# Patient Record
Sex: MALE | Race: WHITE
[De-identification: names, ages, dates, MRNs, and addresses within clinical notes are randomized per-mention and may not be internally consistent; named-entity substitution may affect disease eponyms.]

---

## 2019-11-13 ENCOUNTER — HOSPITAL ENCOUNTER (EMERGENCY)
Dept: HOSPITAL 75 - ER FS | Age: 59
Discharge: HOME | End: 2019-11-13
Payer: COMMERCIAL

## 2019-11-13 VITALS — WEIGHT: 156.53 LBS | HEIGHT: 66.97 IN | BODY MASS INDEX: 24.57 KG/M2

## 2019-11-13 VITALS — DIASTOLIC BLOOD PRESSURE: 79 MMHG | SYSTOLIC BLOOD PRESSURE: 129 MMHG

## 2019-11-13 DIAGNOSIS — T40.2X5A: ICD-10-CM

## 2019-11-13 DIAGNOSIS — R11.0: Primary | ICD-10-CM

## 2019-11-13 DIAGNOSIS — F15.10: ICD-10-CM

## 2019-11-13 DIAGNOSIS — T39.1X5A: ICD-10-CM

## 2019-11-13 LAB
ALBUMIN SERPL-MCNC: 4.1 GM/DL (ref 3.2–4.5)
ALP SERPL-CCNC: 99 U/L (ref 40–136)
ALT SERPL-CCNC: 14 U/L (ref 0–55)
APAP SERPL-MCNC: < 10 UG/ML (ref 10–30)
APTT PPP: YELLOW S
BACTERIA #/AREA URNS HPF: NEGATIVE /HPF
BARBITURATES UR QL: NEGATIVE
BASOPHILS # BLD AUTO: 0.1 10^3/UL (ref 0–0.1)
BASOPHILS NFR BLD AUTO: 1 % (ref 0–10)
BENZODIAZ UR QL SCN: NEGATIVE
BILIRUB SERPL-MCNC: 0.5 MG/DL (ref 0.1–1)
BILIRUB UR QL STRIP: NEGATIVE
BUN/CREAT SERPL: 11
CALCIUM SERPL-MCNC: 9.2 MG/DL (ref 8.5–10.1)
CHLORIDE SERPL-SCNC: 101 MMOL/L (ref 98–107)
CO2 SERPL-SCNC: 26 MMOL/L (ref 21–32)
COCAINE UR QL: NEGATIVE
CREAT SERPL-MCNC: 1.05 MG/DL (ref 0.6–1.3)
EOSINOPHIL # BLD AUTO: 0.3 10^3/UL (ref 0–0.3)
EOSINOPHIL NFR BLD AUTO: 4 % (ref 0–10)
ERYTHROCYTE [DISTWIDTH] IN BLOOD BY AUTOMATED COUNT: 13.1 % (ref 10–14.5)
FIBRINOGEN PPP-MCNC: CLEAR MG/DL
GFR SERPLBLD BASED ON 1.73 SQ M-ARVRAT: > 60 ML/MIN
GLUCOSE SERPL-MCNC: 108 MG/DL (ref 70–105)
GLUCOSE UR STRIP-MCNC: NEGATIVE MG/DL
HCT VFR BLD CALC: 43 % (ref 40–54)
HGB BLD-MCNC: 13.7 G/DL (ref 13.3–17.7)
KETONES UR QL STRIP: NEGATIVE
LEUKOCYTE ESTERASE UR QL STRIP: NEGATIVE
LYMPHOCYTES # BLD AUTO: 1.6 X 10^3 (ref 1–4)
LYMPHOCYTES NFR BLD AUTO: 20 % (ref 12–44)
MANUAL DIFFERENTIAL PERFORMED BLD QL: NO
MCH RBC QN AUTO: 29 PG (ref 25–34)
MCHC RBC AUTO-ENTMCNC: 32 G/DL (ref 32–36)
MCV RBC AUTO: 89 FL (ref 80–99)
METHADONE UR QL SCN: NEGATIVE
METHAMPHETAMINE SCREEN URINE S: NEGATIVE
MONOCYTES # BLD AUTO: 0.5 X 10^3 (ref 0–1)
MONOCYTES NFR BLD AUTO: 6 % (ref 0–12)
NEUTROPHILS # BLD AUTO: 5.5 X 10^3 (ref 1.8–7.8)
NEUTROPHILS NFR BLD AUTO: 69 % (ref 42–75)
NITRITE UR QL STRIP: NEGATIVE
OPIATES UR QL SCN: NEGATIVE
OTHER ELEMENTS URNS MICRO: (no result) /HPF
OXYCODONE UR QL: NEGATIVE
PH UR STRIP: 6.5 [PH] (ref 5–9)
PLATELET # BLD: 304 10^3/UL (ref 130–400)
PMV BLD AUTO: 8.4 FL (ref 7.4–10.4)
POTASSIUM SERPL-SCNC: 4.2 MMOL/L (ref 3.6–5)
PROPOXYPH UR QL: NEGATIVE
PROT SERPL-MCNC: 7 GM/DL (ref 6.4–8.2)
PROT UR QL STRIP: NEGATIVE
RBC #/AREA URNS HPF: (no result) /HPF
SALICYLATES SERPL-MCNC: < 0.3 MG/DL (ref 5–20)
SODIUM SERPL-SCNC: 137 MMOL/L (ref 135–145)
SP GR UR STRIP: 1.02 (ref 1.02–1.02)
SQUAMOUS #/AREA URNS HPF: (no result) /HPF
TRICYCLICS UR QL SCN: NEGATIVE
WBC # BLD AUTO: 8 10^3/UL (ref 4.3–11)
WBC #/AREA URNS HPF: (no result) /HPF

## 2019-11-13 PROCEDURE — 85025 COMPLETE CBC W/AUTO DIFF WBC: CPT

## 2019-11-13 PROCEDURE — 80306 DRUG TEST PRSMV INSTRMNT: CPT

## 2019-11-13 PROCEDURE — 84484 ASSAY OF TROPONIN QUANT: CPT

## 2019-11-13 PROCEDURE — 71045 X-RAY EXAM CHEST 1 VIEW: CPT

## 2019-11-13 PROCEDURE — 70450 CT HEAD/BRAIN W/O DYE: CPT

## 2019-11-13 PROCEDURE — 80320 DRUG SCREEN QUANTALCOHOLS: CPT

## 2019-11-13 PROCEDURE — 36415 COLL VENOUS BLD VENIPUNCTURE: CPT

## 2019-11-13 PROCEDURE — 81000 URINALYSIS NONAUTO W/SCOPE: CPT

## 2019-11-13 PROCEDURE — 83690 ASSAY OF LIPASE: CPT

## 2019-11-13 PROCEDURE — 80053 COMPREHEN METABOLIC PANEL: CPT

## 2019-11-13 PROCEDURE — 93005 ELECTROCARDIOGRAM TRACING: CPT

## 2019-11-13 PROCEDURE — 80329 ANALGESICS NON-OPIOID 1 OR 2: CPT

## 2019-11-13 NOTE — ED CARDIAC GENERAL
History of Present Illness


General


Stated Complaint:  NAUSEA





History of Present Illness


Date Seen by Provider:  Nov 13, 2019


Time Seen by Provider:  08:50


Initial Comments


The patient is a 59-year-old male with a history of chronic right shoulder pain 

for which he takes periodic Norco. Patient presents via EMS with a chief 

complaint of nausea with onset last evening. Patient states he was in his normal

state of health and had dinner without any problems and then took a Morrison 10/325

for his chronic shoulder pain. After that he noted that he was nauseated. He did

not frankly vomit. He had no other symptoms at that time. He then went to bed. 

When he woke up he was still nauseated so he decided to call EMS. He was 

administered Zofran sublingually in route per EMS and his nausea resolved. 

Currently the patient states that he has no symptoms at all aside from a 

slightly unsettled feeling in his stomach. He denies any fevers, nausea or 

vomiting, headache, focal weakness, numbness, tingling, neck stiffness, vision 

changes, shortness of breath or chest pain, abdominal pain of any kind, flank 

pain, back pain, dysuria or hematuria, changes in bowel habits. Patient appears 

in no distress and vital signs are appropriate upon initial evaluation in the 

emergency department.





The patient is noted to nod off at times during conversation and seems a little 

sleepy. Pupils are rather pinpoint. Patient does deny taking any Norco aside 

from last evening. EMS state that they think he may have taken more Norco than 

he is admitting to.





Allergies and Home Medications


Allergies


Coded Allergies:  


     No Known Drug Allergies (Unverified , 11/13/19)





Patient Home Medication List


Home Medication List Reviewed:  Yes





Review of Systems


Review of Systems


Constitutional:  see HPI





All Other Systems Reviewed


Negative Unless Noted:  Yes (Negative excepted noted.)





Past Medical-Social-Family Hx


Past Med/Social Hx:  Reviewed Nursing Past Med/Soc Hx


Patient Social History


Recent Foreign Travel:  No





Family Medical History


Reviewed Nursing Family Hx





Physical Exam


Vital Signs





Vital Signs - First Documented








 11/13/19





 08:51


 


Temp 36.4


 


Pulse 74


 


Resp 18


 


B/P (MAP) 123/91 (102)


 


Pulse Ox 99


 


O2 Delivery Room Air





Capillary Refill :


Height, Weight, BMI


Height: '"


Weight: lbs. oz. kg;  BMI


Method:


General Appearance:  No Apparent Distress


Other comments


This is an older  male appearing nontoxic and in no acute distress. He 

seems a little sleepy but answers questions completely appropriately. Head is 

normocephalic and atraumatic. Neck is supple and nontender. Oropharynx is moist.

Lungs clear to auscultation in all stations. There is a normal S1 and S2 without

rubs or gallops and capillary refill is appropriate, less than 2 seconds 

globally. Abdomen is soft, nontender and nondistended. Skin is warm and dry 

without cyanosis, clubbing or edema. Psychiatrically, the patient demonstrates 

appropriate mood and affect and is alert. Neurologically, cranial nerves II 

through XII are intact and there are no lateralizing deficits noted. Speech is 

normal. Language is normal. Coordination is normal. There is no dysmetria finger

to nose or heel-to-shin bilaterally. Strength is 5 out of 5 in all joints of 

bilateral upper and lower extremities. Sensation is intact to light touch in 

bilateral upper and lower extremities. Patient ambulatory with a narrow, steady 

gait in the emergency department is is alert and oriented 4, though a little 

sleepy as noted. Examination of the left upper extremity is remarkable for mild 

tenderness without erythema, warmth or swelling to the left shoulder. No 

limitation in ranging of the left shoulder and no pain with ranging of any other

joint of the left upper extremity which is neurovascularly intact distally.





Progress/Results/Core Measures


Results/Orders


Lab Results





Laboratory Tests








Test


 11/13/19


09:00 11/13/19


11:25 Range/Units


 


 


White Blood Count


 8.0 


 


 4.3-11.0


10^3/uL


 


Red Blood Count


 4.78 


 


 4.35-5.85


10^6/uL


 


Hemoglobin 13.7   13.3-17.7  G/DL


 


Hematocrit 43   40-54  %


 


Mean Corpuscular Volume 89   80-99  FL


 


Mean Corpuscular Hemoglobin 29   25-34  PG


 


Mean Corpuscular Hemoglobin


Concent 32 


 


 32-36  G/DL





 


Red Cell Distribution Width 13.1   10.0-14.5  %


 


Platelet Count


 304 


 


 130-400


10^3/uL


 


Mean Platelet Volume 8.4   7.4-10.4  FL


 


Neutrophils (%) (Auto) 69   42-75  %


 


Lymphocytes (%) (Auto) 20   12-44  %


 


Monocytes (%) (Auto) 6   0-12  %


 


Eosinophils (%) (Auto) 4   0-10  %


 


Basophils (%) (Auto) 1   0-10  %


 


Neutrophils # (Auto) 5.5   1.8-7.8  X 10^3


 


Lymphocytes # (Auto) 1.6   1.0-4.0  X 10^3


 


Monocytes # (Auto) 0.5   0.0-1.0  X 10^3


 


Eosinophils # (Auto)


 0.3 


 


 0.0-0.3


10^3/uL


 


Basophils # (Auto)


 0.1 


 


 0.0-0.1


10^3/uL


 


Sodium Level 137   135-145  MMOL/L


 


Potassium Level 4.2   3.6-5.0  MMOL/L


 


Chloride Level 101     MMOL/L


 


Carbon Dioxide Level 26   21-32  MMOL/L


 


Anion Gap 10   5-14  MMOL/L


 


Blood Urea Nitrogen 12   7-18  MG/DL


 


Creatinine


 1.05 


 


 0.60-1.30


MG/DL


 


Estimat Glomerular Filtration


Rate > 60 


 


  





 


BUN/Creatinine Ratio 11    


 


Glucose Level 108 H    MG/DL


 


Calcium Level 9.2   8.5-10.1  MG/DL


 


Corrected Calcium 9.1   8.5-10.1  MG/DL


 


Total Bilirubin 0.5   0.1-1.0  MG/DL


 


Aspartate Amino Transf


(AST/SGOT) 23 


 


 5-34  U/L





 


Alanine Aminotransferase


(ALT/SGPT) 14 


 


 0-55  U/L





 


Alkaline Phosphatase 99     U/L


 


Troponin I < 0.30   <0.30  NG/ML


 


Total Protein 7.0   6.4-8.2  GM/DL


 


Albumin 4.1   3.2-4.5  GM/DL


 


Lipase 18   8-78  U/L


 


Salicylates Level < 0.3 L  5.0-20.0  MG/DL


 


Acetaminophen Level < 10 L  10-30  UG/ML


 


Serum Alcohol < 10   <10  MG/DL


 


Urine Color  YELLOW   


 


Urine Clarity  CLEAR   


 


Urine pH  6.5  5-9  


 


Urine Specific Gravity  1.020  1.016-1.022  


 


Urine Protein  NEGATIVE  NEGATIVE  


 


Urine Glucose (UA)  NEGATIVE  NEGATIVE  


 


Urine Ketones  NEGATIVE  NEGATIVE  


 


Urine Nitrite  NEGATIVE  NEGATIVE  


 


Urine Bilirubin  NEGATIVE  NEGATIVE  


 


Urine Urobilinogen  0.2  < = 1.0  MG/DL


 


Urine Leukocyte Esterase  NEGATIVE  NEGATIVE  


 


Urine RBC (Auto)  NEGATIVE  NEGATIVE  


 


Urine RBC  NONE   /HPF


 


Urine WBC  0-2   /HPF


 


Urine Squamous Epithelial


Cells 


 0-2 


  /HPF





 


Urine Crystals  NONE   /LPF


 


Urine Bacteria  NEGATIVE   /HPF


 


Urine Casts  NONE   /LPF


 


Urine Mucus  MODERATE H  /LPF


 


Urine Other  FEW SPERM H  /HPF


 


Urine Culture Indicated  NO   


 


Urine Opiates Screen  NEGATIVE  NEGATIVE  


 


Urine Oxycodone Screen  NEGATIVE  NEGATIVE  


 


Urine Methadone Screen  NEGATIVE  NEGATIVE  


 


Urine Propoxyphene Screen  NEGATIVE  NEGATIVE  


 


Urine Barbiturates Screen  NEGATIVE  NEGATIVE  


 


Ur Tricyclic Antidepressants


Screen 


 NEGATIVE 


 NEGATIVE  





 


Urine Phencyclidine Screen  NEGATIVE  NEGATIVE  


 


Urine Amphetamines Screen  POSITIVE H NEGATIVE  


 


Urine Methamphetamines Screen  NEGATIVE  NEGATIVE  


 


Urine Benzodiazepines Screen  NEGATIVE  NEGATIVE  


 


Urine Cocaine Screen  NEGATIVE  NEGATIVE  


 


Urine Cannabinoids Screen  NEGATIVE  NEGATIVE  








My Orders





Orders - TEJAL MONTIEL MD


Comprehensive Metabolic Panel (11/13/19 09:00)


Troponin I Fs (11/13/19 09:00)


Ekg Tracing (11/13/19 09:00)


Acetaminophen Tablet/Caplet (Tylenol  T (11/13/19 09:00)


Cbc With Automated Diff (11/13/19 09:08)


Naloxone  Injection (Narcan Injection) (11/13/19 09:15)


Ketorolac Injection (Toradol Injection) (11/13/19 09:15)


Metoclopramide Injection (Reglan Injecti (11/13/19 09:15)


Ed Iv/Invasive Line Start (11/13/19 09:08)


Ns Iv 1000 Ml (Sodium Chloride 0.9%) (11/13/19 09:08)


Lipase (11/13/19 09:21)


Ct Head Wo (11/13/19 09:40)


Acetaminophen (11/13/19 09:40)


Salicylate (11/13/19 09:40)


Alcohol (11/13/19 09:40)


Drug Screen Stat (Urine) (11/13/19 09:40)


Chest 1 View Ap/Pa Only (11/13/19 09:40)


Ua Culture If Indicated (11/13/19 09:40)





Medications Given in ED





Current Medications








 Medications  Dose


 Ordered  Sig/Bree


 Route  Start Time


 Stop Time Status Last Admin


Dose Admin


 


 Acetaminophen  975 mg  ONCE  ONCE


 PO  11/13/19 09:00


 11/13/19 09:03 DC 11/13/19 09:13


975 MG


 


 Ketorolac


 Tromethamine  30 mg  ONCE  ONCE


 IVP  11/13/19 09:15


 11/13/19 09:16 DC 11/13/19 09:20


30 MG


 


 Metoclopramide HCl  10 mg  ONCE  ONCE


 IVP  11/13/19 09:15


 11/13/19 09:16 DC 11/13/19 09:20


10 MG


 


 Naloxone HCl  0.4 mg  ONCE  ONCE


 IV  11/13/19 09:15


 11/13/19 09:16 DC 11/13/19 09:26


0.4 MG








Vital Signs/I&O











 11/13/19





 08:51


 


Temp 36.4


 


Pulse 74


 


Resp 18


 


B/P (MAP) 123/91 (102)


 


Pulse Ox 99


 


O2 Delivery Room Air











Progress


Progress Note :  


   Time:  09:18


Progress Note


Suspicion for sleepiness and nausea secondary to opiate use. We will place IV 

and check basic labs, troponin and EKG given nausea and will give IV antiemetic 

and a dose of Narcan and will observe for time and then reevaluate. If workup is

reassuring and patient feels better, plan will be for discharge home to follow 

up very closely with primary care in the next 1-2 days. I have discussed with 

the patient that the first-line medication for periodic musculoskeletal shoulder

discomfort would be a nonsteroidal anti-inflammatory such as ibuprofen. We will 

plan to prescribe some when and if the patient is released.





Update 1000: No effect on patient's somnolence with Narcan. We will escalate 

workup with additional labs, urinalysis and urine toxicology studies, chest x-

ray and CT of the head.





Update 1130: Entire workup is unremarkable and reassuring aside from urine 

toxicology screen positive for amphetamines and opiates. Suspect side effects of

multidrug use as a cause for the patient's significant somnolence. He is resting

comfortably and remains appropriate arousable but as noted continues to be very 

somnolent. Do not feel comfortable releasing him at this time. We will proceed 

with observation admission to [facility]. Patient is graciously 

accepted for telemetry observation by Dr. Garcia. Will initiate transfer at this

time.


Comment


Sinus rhythm, rate 56, no acute ST elevation or depression, , QRS 90, QTC 

422, EP interpretation.





Departure


Impression





   Primary Impression:  


   Nausea alone


   Additional Impressions:  


   Adverse effect of other opioids, initial encounter


   Methamphetamine abuse


Disposition:  09 ADMITTED AS INPATIENT


Condition:  Stable











TEJAL MONTIEL MD              Nov 13, 2019 09:08


POS

## 2019-11-13 NOTE — DIAGNOSTIC IMAGING REPORT
INDICATION: Nausea.



COMPARISON: None.



FINDINGS: Single frontal view of the chest demonstrates normal

heart size and pulmonary vascularity. The lungs are well aerated

and clear. No large pleural effusion or pneumothorax is seen. The

visualized osseous structures show no acute abnormalities.



IMPRESSION:

1. No acute cardiopulmonary process.



Dictated by: 



  Dictated on workstation # [id number]

## 2019-11-13 NOTE — NUR
Dr Byers to room, orientation completed with pt slow to give an appropriate 
answer to date, place, President, year. Pt is refusing admit. Dr gave risks and 
benefits then agreed to this but advises of the importance to return if further 
concerns or sx.

## 2019-11-13 NOTE — DIAGNOSTIC IMAGING REPORT
PROCEDURE: CT head without contrast.



TECHNIQUE: Multiple contiguous axial images were obtained through

the brain without the use of intravenous contrast. Auto Exposure

Controls were utilized during the CT exam to meet ALARA standards

for radiation dose reduction. 



INDICATION:  Hypersomnolence.



FINDINGS: The examination is compromised due to motion.

Ventricles and sulci are grossly within normal limits. No

hydrocephalus. No midline shift. There is no mass, hemorrhage or

extra-axial fluid collection. The calvarium is intact. Sinuses

and mastoid air cells are clear.



IMPRESSION: No acute intracranial abnormality although

examination is technically limited due to motion artifact.



Dictated by: 



  Dictated on workstation # [id number]

## 2019-11-13 NOTE — NUR
Pt refusal at this time to sign transfer form and requesting to speak with . 
Pt is wide awake and conversing with staff and states, "It was a combination of 
being tired and possibly dehydrated." IIf I am transferred, I have no one to 
come to this hospital to get ride out."

## 2020-10-24 ENCOUNTER — HOSPITAL ENCOUNTER (INPATIENT)
Dept: HOSPITAL 75 - ER | Age: 60
LOS: 3 days | Discharge: HOME | DRG: 308 | End: 2020-10-27
Attending: FAMILY MEDICINE | Admitting: FAMILY MEDICINE
Payer: SELF-PAY

## 2020-10-24 VITALS — DIASTOLIC BLOOD PRESSURE: 68 MMHG | SYSTOLIC BLOOD PRESSURE: 104 MMHG

## 2020-10-24 VITALS — DIASTOLIC BLOOD PRESSURE: 68 MMHG | SYSTOLIC BLOOD PRESSURE: 98 MMHG

## 2020-10-24 VITALS — WEIGHT: 138.67 LBS | HEIGHT: 66.14 IN | BODY MASS INDEX: 22.29 KG/M2

## 2020-10-24 VITALS — DIASTOLIC BLOOD PRESSURE: 48 MMHG | SYSTOLIC BLOOD PRESSURE: 94 MMHG

## 2020-10-24 VITALS — SYSTOLIC BLOOD PRESSURE: 88 MMHG | DIASTOLIC BLOOD PRESSURE: 62 MMHG

## 2020-10-24 VITALS — SYSTOLIC BLOOD PRESSURE: 126 MMHG | DIASTOLIC BLOOD PRESSURE: 79 MMHG

## 2020-10-24 DIAGNOSIS — J18.9: ICD-10-CM

## 2020-10-24 DIAGNOSIS — Z20.828: ICD-10-CM

## 2020-10-24 DIAGNOSIS — F17.210: ICD-10-CM

## 2020-10-24 DIAGNOSIS — I48.91: Primary | ICD-10-CM

## 2020-10-24 DIAGNOSIS — I48.92: ICD-10-CM

## 2020-10-24 DIAGNOSIS — F15.10: ICD-10-CM

## 2020-10-24 DIAGNOSIS — I10: ICD-10-CM

## 2020-10-24 LAB
ALBUMIN SERPL-MCNC: 3.7 GM/DL (ref 3.2–4.5)
ALP SERPL-CCNC: 80 U/L (ref 40–136)
ALT SERPL-CCNC: 14 U/L (ref 0–55)
APTT BLD: 35 SEC (ref 24–35)
BARBITURATES UR QL: NEGATIVE
BASOPHILS # BLD AUTO: 0.1 10^3/UL (ref 0–0.1)
BASOPHILS NFR BLD AUTO: 0 % (ref 0–10)
BASOPHILS NFR BLD MANUAL: 0 %
BENZODIAZ UR QL SCN: NEGATIVE
BILIRUB SERPL-MCNC: 1.1 MG/DL (ref 0.1–1)
BUN/CREAT SERPL: 16
CALCIUM SERPL-MCNC: 8.4 MG/DL (ref 8.5–10.1)
CHLORIDE SERPL-SCNC: 103 MMOL/L (ref 98–107)
CO2 SERPL-SCNC: 24 MMOL/L (ref 21–32)
COCAINE UR QL: NEGATIVE
CREAT SERPL-MCNC: 1.04 MG/DL (ref 0.6–1.3)
EOSINOPHIL # BLD AUTO: 0 10^3/UL (ref 0–0.3)
EOSINOPHIL NFR BLD AUTO: 0 % (ref 0–10)
EOSINOPHIL NFR BLD MANUAL: 0 %
GFR SERPLBLD BASED ON 1.73 SQ M-ARVRAT: > 60 ML/MIN
GLUCOSE SERPL-MCNC: 89 MG/DL (ref 70–105)
HCT VFR BLD CALC: 42 % (ref 40–54)
HGB BLD-MCNC: 13.1 G/DL (ref 13.3–17.7)
INR PPP: 1.2 (ref 0.8–1.4)
LYMPHOCYTES # BLD AUTO: 1.4 10^3/UL (ref 1–4)
LYMPHOCYTES NFR BLD AUTO: 6 % (ref 12–44)
MAGNESIUM SERPL-MCNC: 2.1 MG/DL (ref 1.6–2.4)
MANUAL DIFFERENTIAL PERFORMED BLD QL: YES
MCH RBC QN AUTO: 29 PG (ref 25–34)
MCHC RBC AUTO-ENTMCNC: 32 G/DL (ref 32–36)
MCV RBC AUTO: 91 FL (ref 80–99)
METHADONE UR QL SCN: NEGATIVE
METHAMPHETAMINE SCREEN URINE S: POSITIVE
MONOCYTES # BLD AUTO: 1.3 10^3/UL (ref 0–1)
MONOCYTES NFR BLD AUTO: 6 % (ref 0–12)
MONOCYTES NFR BLD: 4 %
NEUTROPHILS # BLD AUTO: 20.7 10^3/UL (ref 1.8–7.8)
NEUTROPHILS NFR BLD AUTO: 86 % (ref 42–75)
NEUTS BAND NFR BLD MANUAL: 89 %
NEUTS BAND NFR BLD: 3 %
OPIATES UR QL SCN: POSITIVE
OXYCODONE UR QL: NEGATIVE
PLATELET # BLD: 179 10^3/UL (ref 130–400)
PMV BLD AUTO: 8.4 FL (ref 9–12.2)
POTASSIUM SERPL-SCNC: 3.7 MMOL/L (ref 3.6–5)
PROPOXYPH UR QL: NEGATIVE
PROT SERPL-MCNC: 6.5 GM/DL (ref 6.4–8.2)
PROTHROMBIN TIME: 16 SEC (ref 12.2–14.7)
RBC MORPH BLD: NORMAL
SODIUM SERPL-SCNC: 134 MMOL/L (ref 135–145)
TRICYCLICS UR QL SCN: NEGATIVE
VARIANT LYMPHS NFR BLD MANUAL: 4 %
WBC # BLD AUTO: 24.2 10^3/UL (ref 4.3–11)

## 2020-10-24 PROCEDURE — 86803 HEPATITIS C AB TEST: CPT

## 2020-10-24 PROCEDURE — 81000 URINALYSIS NONAUTO W/SCOPE: CPT

## 2020-10-24 PROCEDURE — 36415 COLL VENOUS BLD VENIPUNCTURE: CPT

## 2020-10-24 PROCEDURE — 85379 FIBRIN DEGRADATION QUANT: CPT

## 2020-10-24 PROCEDURE — 80048 BASIC METABOLIC PNL TOTAL CA: CPT

## 2020-10-24 PROCEDURE — 86703 HIV-1/HIV-2 1 RESULT ANTBDY: CPT

## 2020-10-24 PROCEDURE — 83874 ASSAY OF MYOGLOBIN: CPT

## 2020-10-24 PROCEDURE — 87804 INFLUENZA ASSAY W/OPTIC: CPT

## 2020-10-24 PROCEDURE — 93312 ECHO TRANSESOPHAGEAL: CPT

## 2020-10-24 PROCEDURE — 80306 DRUG TEST PRSMV INSTRMNT: CPT

## 2020-10-24 PROCEDURE — 93005 ELECTROCARDIOGRAM TRACING: CPT

## 2020-10-24 PROCEDURE — 71046 X-RAY EXAM CHEST 2 VIEWS: CPT

## 2020-10-24 PROCEDURE — 85025 COMPLETE CBC W/AUTO DIFF WBC: CPT

## 2020-10-24 PROCEDURE — 85610 PROTHROMBIN TIME: CPT

## 2020-10-24 PROCEDURE — 84484 ASSAY OF TROPONIN QUANT: CPT

## 2020-10-24 PROCEDURE — 87040 BLOOD CULTURE FOR BACTERIA: CPT

## 2020-10-24 PROCEDURE — 87635 SARS-COV-2 COVID-19 AMP PRB: CPT

## 2020-10-24 PROCEDURE — 85007 BL SMEAR W/DIFF WBC COUNT: CPT

## 2020-10-24 PROCEDURE — 93306 TTE W/DOPPLER COMPLETE: CPT

## 2020-10-24 PROCEDURE — 93325 DOPPLER ECHO COLOR FLOW MAPG: CPT

## 2020-10-24 PROCEDURE — 84443 ASSAY THYROID STIM HORMONE: CPT

## 2020-10-24 PROCEDURE — 80053 COMPREHEN METABOLIC PANEL: CPT

## 2020-10-24 PROCEDURE — 93320 DOPPLER ECHO COMPLETE: CPT

## 2020-10-24 PROCEDURE — 71045 X-RAY EXAM CHEST 1 VIEW: CPT

## 2020-10-24 PROCEDURE — 85027 COMPLETE CBC AUTOMATED: CPT

## 2020-10-24 PROCEDURE — 83735 ASSAY OF MAGNESIUM: CPT

## 2020-10-24 PROCEDURE — 85730 THROMBOPLASTIN TIME PARTIAL: CPT

## 2020-10-24 PROCEDURE — 80061 LIPID PANEL: CPT

## 2020-10-24 PROCEDURE — 93041 RHYTHM ECG TRACING: CPT

## 2020-10-24 RX ADMIN — ACETAMINOPHEN PRN MG: 500 TABLET ORAL at 23:35

## 2020-10-24 NOTE — DIAGNOSTIC IMAGING REPORT
EXAM: Chest 1 view, AP/PA only.



INDICATION: Chest pain.



COMPARISON: 11/13/2019 chest radiograph.



FINDINGS: Normal heart size and central pulmonary vascularity. No

focal pulmonary opacity. No pleural effusion or pneumothorax. No

acute osseous finding. No significant change.



IMPRESSION: No acute cardiopulmonary finding. 



Dictated by: 



  Dictated on workstation # [id number]

## 2020-10-24 NOTE — NUR
THIS RN NOTIFIED DR. RIOJAS OF PATIENT'S LOW GRADE FEVER AND CP 8/10. NEW ORDERS RECEIVED AT 
THIS TIME, SEE ORDER HX.

## 2020-10-24 NOTE — ED CARDIAC GENERAL
History of Present Illness


General


Stated Complaint:  SVT


Source:  patient


Exam Limitations:  no limitations





History of Present Illness


Date Seen by Provider:  Oct 24, 2020


Time Seen by Provider:  18:22


Initial Comments


Here by EMS from [facility] with report of SVT. Noted to have heart rate greater 

than 160 by EMS. They did give adenosine and rate slowed briefly and atrial 

flutter was noted. Patient also complaining of chest pain so they came directly 

to STEMI center given concerns. Patient reports that he's had 2-3 days of 

intermittent chest pain that worsened this evening requiring EMS. EMS did give 

324 mg of aspirin by mouth and initiated normal saline 1 L bolus. Patient does 

work as a  and reports that he's probably dehydrated. He has recently 

started drinking coffee again and using Powerade. He states that that amps him 

up quite a bit though and that may be part of the problem. Never had problems 

with atrial fibrillation/flutter or other heart problems previously. History of 

methamphetamine abuse and drinking alcohol but has been clean for 10 years. 

Denies nausea, vomiting or diarrhea. Denies fever or chills or COVID-19 contact 

or symptoms.


Timing/Duration:  getting worse, intermittent, 2-3 days


Severity:  moderate


Location:  central (left sided)


Activities at Onset:  none


Prior CP/Workup:  no prior chest pain, no prior cardiac workup


Modifying Factors:  worse with exercise; improves with rest


NTG SL PTA:  No


ASA po PTA:  Yes


Associated Systoms:  Chest Pain; No Diaphoresis, No Fever/Chills, No 

Nausea/Vomiting, No Shortness of Air, No Weakness





Allergies and Home Medications


Allergies


Coded Allergies:  


     No Known Drug Allergies (Unverified , 19)





Patient Home Medication List


Home Medication List Reviewed:  Yes





Review of Systems


Review of Systems


Constitutional:  see HPI; No chills, No fever


EENTM:  No Symptoms Reported


Respiratory:  Denies Cough, Denies Shortness of Air


Cardiovascular:  Chest Pain; Denies Edema; Irregular Heart Rate, Lightheadedness


Gastrointestinal:  Denies Nausea, Denies Vomiting


Genitourinary:  No Symptoms Reported


Musculoskeletal:  no symptoms reported


Skin:  no symptoms reported


Psychiatric/Neurological:  No Symptoms Reported





All Other Systems Reviewed


Negative Unless Noted:  Yes





Past Medical-Social-Family Hx


Past Med/Social Hx:  Reviewed Nursing Past Med/Soc Hx


Patient Social History


Alcohol Use:  Denies Use


Recreational Drug Use:  No


Smoking Status:  Current Everyday Smoker


2nd Hand Smoke Exposure:  No


Recent Hopitalizations:  No





Seasonal Allergies


Seasonal Allergies:  No





Past Medical History


Surgeries:  Yes (Bilateral Total Hip Replacement)


Orthopedic


Respiratory:  No


Cardiac:  No


Neurological:  No


Genitourinary:  No


Gastrointestinal:  No


Musculoskeletal:  Yes (Chronic shoulder/hip pain)


Endocrine:  No


HEENT:  No


Cancer:  No


Psychosocial:  No


Blood Disorders:  No





Family Medical History


Reviewed Nursing Family Hx


No Pertinent Family Hx





Physical Exam


Vital Signs


Capillary Refill :


Height, Weight, BMI


Height: '"


Weight: lbs. oz. kg; 24.00 BMI


Method:


General Appearance:  No Apparent Distress, Thin


HEENT:  PERRL/EOMI, Pharynx Normal


Neck:  Non Tender, Supple


Respiratory:  No Accessory Muscle Use, No Respiratory Distress, Pleural Rub 

(left sided)


Cardiovascular:  Irregularly Irregular, Tachycardia


Gastrointestinal:  Normal Bowel Sounds


Extremity:  Normal Range of Motion, Non Tender


Neurologic/Psychiatric:  Alert, Oriented x3


Skin:  Normal Color, Warm/Dry





Progress/Results/Core Measures


Results/Orders


Lab Results





Laboratory Tests








Test


 10/24/20


18:38 Range/Units


 


 


White Blood Count


 24.2 H


 4.3-11.0


10^3/uL


 


Red Blood Count


 4.59 


 4.30-5.52


10^6/uL


 


Hemoglobin 13.1 L 13.3-17.7  g/dL


 


Hematocrit 42  40-54  %


 


Mean Corpuscular Volume 91  80-99  fL


 


Mean Corpuscular Hemoglobin 29  25-34  pg


 


Mean Corpuscular Hemoglobin


Concent 32 


 32-36  g/dL





 


Red Cell Distribution Width 13.8  10.0-14.5  %


 


Platelet Count


 179 


 130-400


10^3/uL


 


Mean Platelet Volume 8.4 L 9.0-12.2  fL


 


Immature Granulocyte % (Auto) 3   %


 


Neutrophils (%) (Auto) 86 H 42-75  %


 


Lymphocytes (%) (Auto) 6 L 12-44  %


 


Monocytes (%) (Auto) 6  0-12  %


 


Eosinophils (%) (Auto) 0  0-10  %


 


Basophils (%) (Auto) 0  0-10  %


 


Neutrophils # (Auto)


 20.7 H


 1.8-7.8


10^3/uL


 


Lymphocytes # (Auto)


 1.4 


 1.0-4.0


10^3/uL


 


Monocytes # (Auto)


 1.3 H


 0.0-1.0


10^3/uL


 


Eosinophils # (Auto)


 0.0 


 0.0-0.3


10^3/uL


 


Basophils # (Auto)


 0.1 


 0.0-0.1


10^3/uL


 


Immature Granulocyte # (Auto)


 0.7 H


 0.0-0.1


10^3/uL


 


Neutrophils % (Manual) 89   %


 


Lymphocytes % (Manual) 4   %


 


Monocytes % (Manual) 4   %


 


Eosinophils % (Manual) 0   %


 


Basophils % (Manual) 0   %


 


Band Neutrophils 3   %


 


Blood Morphology Comment NORMAL   


 


Prothrombin Time 16.0 H 12.2-14.7  SEC


 


INR Comment 1.2  0.8-1.4  


 


Activated Partial


Thromboplast Time 35 


 24-35  SEC





 


D-Dimer


 0.57 H


 0.00-0.49


UG/ML


 


Sodium Level 134 L 135-145  MMOL/L


 


Potassium Level 3.7  3.6-5.0  MMOL/L


 


Chloride Level 103    MMOL/L


 


Carbon Dioxide Level 24  21-32  MMOL/L


 


Anion Gap 7  5-14  MMOL/L


 


Blood Urea Nitrogen 17  7-18  MG/DL


 


Creatinine


 1.04 


 0.60-1.30


MG/DL


 


Estimat Glomerular Filtration


Rate > 60 


  





 


BUN/Creatinine Ratio 16   


 


Glucose Level 89    MG/DL


 


Calcium Level 8.4 L 8.5-10.1  MG/DL


 


Corrected Calcium 8.6  8.5-10.1  MG/DL


 


Magnesium Level 2.1  1.6-2.4  MG/DL


 


Total Bilirubin 1.1 H 0.1-1.0  MG/DL


 


Aspartate Amino Transf


(AST/SGOT) 21 


 5-34  U/L





 


Alanine Aminotransferase


(ALT/SGPT) 14 


 0-55  U/L





 


Alkaline Phosphatase 80    U/L


 


Myoglobin


 119.6 H


 10.0-92.0


NG/ML


 


Troponin I < 0.028  <0.028  NG/ML


 


Total Protein 6.5  6.4-8.2  GM/DL


 


Albumin 3.7  3.2-4.5  GM/DL








My Orders





Orders - JACKELYN ZEPEDA MD


Cbc With Automated Diff (10/24/20 18:36)


Magnesium (10/24/20 18:36)


Chest 1 View, Ap/Pa Only (10/24/20 18:36)


Ekg Tracing (10/24/20 18:36)


Comprehensive Metabolic Panel (10/24/20 18:36)


Myoglobin Serum (10/24/20 18:36)


Protime With Inr (10/24/20 18:36)


Partial Thromboplastin Time (10/24/20 18:36)


O2 (10/24/20 18:36)


Monitor-Rhythm Ecg Trace Only (10/24/20 18:36)


Lipid Panel (10/25/20 06:00)


Ed Iv/Invasive Line Start (10/24/20 18:36)


Troponin I (10/24/20 18:36)


Fibrin Degradation Products (10/24/20 18:36)


Diltiazem Drip Pre-Mix (Cardizem Drip Pr (10/24/20 18:45)


Diltiazem Injection (Cardizem Injection) (10/24/20 18:45)


Diltiazem Drip Pre-Mix (Cardizem Drip Pr (10/24/20 18:40)


Ns Iv 1000 Ml (Sodium Chloride 0.9%) (10/24/20 18:40)


Diltiazem Injection (Cardizem Injection) (10/24/20 18:40)


Manual Differential (10/24/20 18:38)


Lactated Ringers (Lr 1000 Ml Iv Solution (10/24/20 18:49)


Enoxaparin Injection (Lovenox Injection) (10/24/20 19:15)


Metoprolol Tartrate Injection (Lopressor (10/24/20 19:30)


Morphine  Injection (Morphine  Injection (10/24/20 19:45)





Medications Given in ED





Current Medications








 Medications  Dose


 Ordered  Sig/Bree


 Route  Start Time


 Stop Time Status Last Admin


Dose Admin


 


 Diltiazem HCl  20 mg  ONCE  ONCE


 IVP  10/24/20 18:45


 10/24/20 18:46 DC 10/24/20 18:43


20 MG


 


 Enoxaparin Sodium  60 mg  ONCE  ONCE


 SC  10/24/20 19:15


 10/24/20 19:16 DC 10/24/20 19:33


60 MG


 


 Lactated Ringer's  1,000 ml @ 


 0 mls/hr  Q0M ONCE


 IV  10/24/20 18:49


 10/24/20 18:50 DC 10/24/20 18:57


1,000 MLS/HR


 


 Sodium Chloride  1,000 ml @ 


 ud  STK-MED ONCE


 .ROUTE  10/24/20 18:40


 10/24/20 18:41 DC 10/24/20 18:55


1,000 MLS/HR











Progress


Progress Note :  


Progress Note


Seen and evaluated. IV 2 by EMS. EMS 1 L bolus initiated by EMS. Cardizem bolus

20 mg IV initiated with drip at 10 mg per hour to follow. : I did discuss 

the case with Dr. Cervantes and he agrees to consult and recommends Lovenox 1 mg/kg 

which will be ordered. He will see the patient in the emergency department. I 

have discussed the case with Dr. Jarvis and she agrees to admission, inpatient 

status to ICU or stepdown depending on nursing protocols. : Patient is 

otherwise stable and troponin is negative. Okay for cardiac step down. Cardizem 

drip running at 10 mg per hour currently. He is having some chest pain so 

morphine 4 mg IV ordered. Blood pressure is normal. Patient agrees to admission.

Admit, inpatient status.


Initial ECG Impression Date:  Oct 24, 2020


Initial ECG Impression Time:  18:26


Initial ECG Rate:  154


Initial ECG Rhythm:  A Fib/Flutter


Initial ECG Impression:  Atrial Fibrillation w/RVR


Comment


A flutter with rapid ventricular response. Left axis deviation. No evidence of 

ST elevation MI. No previous available for comparison. Interpreted by me.


EKG :  


   EKG Time:  18:56


   Rhythm:  A Fib/Flutter


   ECG Comparisson:  Changed


Comment


Atrial flutter with normal axis. Normal rate. No evidence of ST elevation MI. 

Intraventricular conduction delay noted. Change from previous. Interpreted by 

me.





Diagnostic Imaging





   Diagonstic Imaging:  Xray


   Plain Films/CT/US/NM/MRI:  chest


Comments


                 [facility], [place], [place]





NAME:   ANDRIATERRANCE ERVIN


MED REC#:   [id number]


ACCOUNT#:   [id number]


PT STATUS:   REG ER


:   1960


PHYSICIAN:   JACKELYN ZEPEDA MD


ADMIT DATE:   10/24/20/ER


                                   ***Draft***


Date of Exam:10/24/20





CHEST 1 VIEW, AP/PA ONLY








EXAM: Chest 1 view, AP/PA only.





INDICATION: Chest pain.





COMPARISON: 2019 chest radiograph.





FINDINGS: Normal heart size and central pulmonary vascularity. No


focal pulmonary opacity. No pleural effusion or pneumothorax. No


acute osseous finding. No significant change.





IMPRESSION: No acute cardiopulmonary finding. 





  Dictated on workstation # [id number]








Dict:   10/24/20 1921


Trans:   10/24/20 1922


[facility] [id number]





Interpreted by:     OZZY HANNA MD


Electronically signed by:





Departure


Communication (Admissions)


Time/Spoke to Admitting Phy:  18:55


Time/Spoke to Consulting Phy:  19:05





Impression





   Primary Impression:  


   Atrial flutter with rapid ventricular response


   Additional Impression:  


   Chest pain


   Qualified Codes:  R07.9 - Chest pain, unspecified


Disposition:   ADMITTED AS INPATIENT


Condition:  Stable





Admissions


Decision to Admit Reason:  Admit from ER (General)


Decision to Admit/Date:  Oct 24, 2020


Time/Decision to Admit Time:  18:50





Departure-Patient Inst.


Referrals:  


LUIZ ACOSTA MD (PCP/Family)


Primary Care Physician











JACKELYN ZEPEDA MD          Oct 24, 2020 19:06

## 2020-10-24 NOTE — NUR
DR. ESPARZA NOTIFIED OF PATIENT'S CONTINUED CHEST PAIN 8/10, AND PATIENT'S DECREASED SBP ON 
CARDIZEM DRIP, SBP IN 80'S. DR. ESPARZA AT BEDSIDE AT THIS TIME. NEW ORDERS RECEIVED AT THIS 
TIME, SEE ORDER HX AND EMAR.

## 2020-10-24 NOTE — CONSULTATION-CARDIOLOGY
HPI-Cardiology


Cardiology Consultation


Date of Consultation


10/24/20


Date of Admission





Time Seen by Provider:  19:34


Indication:  chest pain





HPI


60 years old gentleman with no significant past medical history who exercise 

daily walk about 5 miles without difficulty.  Started to have palpitation, felt 

his heart racing and had some chest pain and heaviness in the retrosternal area.

 Came into the emergency room and noted to be in atrial flutter with rapid 

ventricular response.  On my evaluation his rate was variable between 80 and 

130, he had received one dose of IV Cardizem 20 mg and planning to initiate 

Cardizem drip.  Started to have chest pain once his heart rate was 113 and felt 

somewhat better after his heart rate improved.  No similar episodes in the past.

 No previous history of palpitation or any other symptoms.





Home Medications & Allergies


Allergies:  


Coded Allergies:  


     No Known Drug Allergies (Unverified , 11/13/19)


Home Medication List Reviewed:  Yes


Doesn't take any medication at home





PMH-Social-Family Hx


Patient Social History


Marital Status:  


Alcohol Use:  Denies Use


Recreational Drug Use:  No


Smoking Status:  Current Everyday Smoker


2nd Hand Smoke Exposure:  No


Recent Hopitalizations:  No





Past Medical History


No known past medical history





Family Medical History


Significant Family History:  No Pertinent Family Hx


Family Medical Hx


Noncontributory





Review of Systems-General


Review of Systems


Constitutional:  see HPI; No chills, No fever


EENTM:  see HPI, no symptoms reported


Respiratory:  see HPI; No cough, No dyspnea on exertion, No hemoptysis, No 

orthopnea, No phlegm, No short of breath, No stridor, No wheezing, No other


Cardiovascular:  see HPI, chest pain; No edema, No Hx of Intervention; 

palpitations; No syncope, No vascular heart diseas, No other


Gastrointestinal:  no symptoms reported, see HPI


Genitourinary:  no symptoms reported, see HPI


Musculoskeletal:  no symptoms reported, see HPI


Skin:  no symptoms reported, see HPI


Psychiatric/Neurological:  No Symptoms Reported, See HPI





All Other Systems Reviewed


Negative Unless Noted:  Yes





Reviewed Test Results


Reviewed Test Results


Lab





Laboratory Tests








Test


 10/24/20


18:38 Range/Units


 


 


White Blood Count


 24.2 H


 4.3-11.0


10^3/uL


 


Red Blood Count


 4.59 


 4.30-5.52


10^6/uL


 


Hemoglobin 13.1 L 13.3-17.7  g/dL


 


Hematocrit 42  40-54  %


 


Mean Corpuscular Volume 91  80-99  fL


 


Mean Corpuscular Hemoglobin 29  25-34  pg


 


Mean Corpuscular Hemoglobin


Concent 32 


 32-36  g/dL





 


Red Cell Distribution Width 13.8  10.0-14.5  %


 


Platelet Count


 179 


 130-400


10^3/uL


 


Mean Platelet Volume 8.4 L 9.0-12.2  fL


 


Immature Granulocyte % (Auto) 3   %


 


Neutrophils (%) (Auto) 86 H 42-75  %


 


Lymphocytes (%) (Auto) 6 L 12-44  %


 


Monocytes (%) (Auto) 6  0-12  %


 


Eosinophils (%) (Auto) 0  0-10  %


 


Basophils (%) (Auto) 0  0-10  %


 


Neutrophils # (Auto)


 20.7 H


 1.8-7.8


10^3/uL


 


Lymphocytes # (Auto)


 1.4 


 1.0-4.0


10^3/uL


 


Monocytes # (Auto)


 1.3 H


 0.0-1.0


10^3/uL


 


Eosinophils # (Auto)


 0.0 


 0.0-0.3


10^3/uL


 


Basophils # (Auto)


 0.1 


 0.0-0.1


10^3/uL


 


Immature Granulocyte # (Auto)


 0.7 H


 0.0-0.1


10^3/uL


 


Neutrophils % (Manual) 89   %


 


Lymphocytes % (Manual) 4   %


 


Monocytes % (Manual) 4   %


 


Eosinophils % (Manual) 0   %


 


Basophils % (Manual) 0   %


 


Band Neutrophils 3   %


 


Blood Morphology Comment NORMAL   


 


Prothrombin Time 16.0 H 12.2-14.7  SEC


 


INR Comment 1.2  0.8-1.4  


 


Activated Partial


Thromboplast Time 35 


 24-35  SEC





 


D-Dimer


 0.57 H


 0.00-0.49


UG/ML


 


Sodium Level 134 L 135-145  MMOL/L


 


Potassium Level 3.7  3.6-5.0  MMOL/L


 


Chloride Level 103    MMOL/L


 


Carbon Dioxide Level 24  21-32  MMOL/L


 


Anion Gap 7  5-14  MMOL/L


 


Blood Urea Nitrogen 17  7-18  MG/DL


 


Creatinine


 1.04 


 0.60-1.30


MG/DL


 


Estimat Glomerular Filtration


Rate > 60 


  





 


BUN/Creatinine Ratio 16   


 


Glucose Level 89    MG/DL


 


Calcium Level 8.4 L 8.5-10.1  MG/DL


 


Corrected Calcium 8.6  8.5-10.1  MG/DL


 


Magnesium Level 2.1  1.6-2.4  MG/DL


 


Total Bilirubin 1.1 H 0.1-1.0  MG/DL


 


Aspartate Amino Transf


(AST/SGOT) 21 


 5-34  U/L





 


Alanine Aminotransferase


(ALT/SGPT) 14 


 0-55  U/L





 


Alkaline Phosphatase 80    U/L


 


Myoglobin


 119.6 H


 10.0-92.0


NG/ML


 


Troponin I < 0.028  <0.028  NG/ML


 


Total Protein 6.5  6.4-8.2  GM/DL


 


Albumin 3.7  3.2-4.5  GM/DL











Physical Exam


Physical Exam


Vital Signs


Capillary Refill :


Height, Weight, BMI


Height: '"


Weight: lbs. oz. kg; 24.00 BMI


Method:


General Appearance:  No Apparent Distress, Thin


Eyes:  Bilateral Eye Normal Inspection, Bilateral Eye PERRL, Bilateral Eye EOMI


HEENT:  PERRL/EOMI, Pharynx Normal


Neck:  Non Tender, Supple


Respiratory:  No Accessory Muscle Use, No Respiratory Distress, Pleural Rub 

(left sided)


Cardiovascular:  No Murmur, Irregularly Irregular, Tachycardia


Gastrointestinal:  Normal Bowel Sounds


Back:  Normal Inspection, No CVA Tenderness, No Vertebral Tenderness


Extremity:  Normal Range of Motion, Non Tender


Neurologic/Psychiatric:  Alert, Oriented x3


Skin:  Normal Color, Warm/Dry


Lymphatic:  No Adenopathy





A/P-Cardiology


Admission Diagnosis


Atrial flutter


Tachycardia


Chest pain


Hypertension





Assessment/Plan


Atrial flutter with rapid ventricular response, started on diltiazem.  I will 

add Lovenox.  Continue to monitor heart rate and adjusted according to heart 

rate.





Palpitation, secondary to tachycardia.  Continue to monitor





Chest pain, probably secondary to tachycardia, I will continue to monitor 

cardiac enzymes, EKG did not show any acute changes





Hypertension, monitor blood pressure after initiating Cardizem.





Clinical Quality Measures


AMI/AHF:


ASA po Prior to arrival:  Yes











CRISTAL ESPARZA MD              Oct 24, 2020 19:37

## 2020-10-25 VITALS — SYSTOLIC BLOOD PRESSURE: 119 MMHG | DIASTOLIC BLOOD PRESSURE: 88 MMHG

## 2020-10-25 VITALS — DIASTOLIC BLOOD PRESSURE: 67 MMHG | SYSTOLIC BLOOD PRESSURE: 94 MMHG

## 2020-10-25 VITALS — SYSTOLIC BLOOD PRESSURE: 106 MMHG | DIASTOLIC BLOOD PRESSURE: 88 MMHG

## 2020-10-25 VITALS — SYSTOLIC BLOOD PRESSURE: 127 MMHG | DIASTOLIC BLOOD PRESSURE: 79 MMHG

## 2020-10-25 VITALS — DIASTOLIC BLOOD PRESSURE: 52 MMHG | SYSTOLIC BLOOD PRESSURE: 89 MMHG

## 2020-10-25 VITALS — DIASTOLIC BLOOD PRESSURE: 62 MMHG | SYSTOLIC BLOOD PRESSURE: 104 MMHG

## 2020-10-25 VITALS — SYSTOLIC BLOOD PRESSURE: 113 MMHG | DIASTOLIC BLOOD PRESSURE: 75 MMHG

## 2020-10-25 VITALS — SYSTOLIC BLOOD PRESSURE: 97 MMHG | DIASTOLIC BLOOD PRESSURE: 63 MMHG

## 2020-10-25 VITALS — DIASTOLIC BLOOD PRESSURE: 61 MMHG | SYSTOLIC BLOOD PRESSURE: 110 MMHG

## 2020-10-25 VITALS — DIASTOLIC BLOOD PRESSURE: 59 MMHG | SYSTOLIC BLOOD PRESSURE: 89 MMHG

## 2020-10-25 VITALS — SYSTOLIC BLOOD PRESSURE: 93 MMHG | DIASTOLIC BLOOD PRESSURE: 71 MMHG

## 2020-10-25 VITALS — DIASTOLIC BLOOD PRESSURE: 78 MMHG | SYSTOLIC BLOOD PRESSURE: 115 MMHG

## 2020-10-25 VITALS — SYSTOLIC BLOOD PRESSURE: 89 MMHG | DIASTOLIC BLOOD PRESSURE: 53 MMHG

## 2020-10-25 VITALS — SYSTOLIC BLOOD PRESSURE: 104 MMHG | DIASTOLIC BLOOD PRESSURE: 74 MMHG

## 2020-10-25 VITALS — SYSTOLIC BLOOD PRESSURE: 98 MMHG | DIASTOLIC BLOOD PRESSURE: 58 MMHG

## 2020-10-25 VITALS — SYSTOLIC BLOOD PRESSURE: 90 MMHG | DIASTOLIC BLOOD PRESSURE: 56 MMHG

## 2020-10-25 VITALS — SYSTOLIC BLOOD PRESSURE: 95 MMHG | DIASTOLIC BLOOD PRESSURE: 59 MMHG

## 2020-10-25 VITALS — DIASTOLIC BLOOD PRESSURE: 76 MMHG | SYSTOLIC BLOOD PRESSURE: 111 MMHG

## 2020-10-25 VITALS — SYSTOLIC BLOOD PRESSURE: 98 MMHG | DIASTOLIC BLOOD PRESSURE: 79 MMHG

## 2020-10-25 VITALS — DIASTOLIC BLOOD PRESSURE: 50 MMHG | SYSTOLIC BLOOD PRESSURE: 88 MMHG

## 2020-10-25 LAB
ALBUMIN SERPL-MCNC: 3.1 GM/DL (ref 3.2–4.5)
ALP SERPL-CCNC: 69 U/L (ref 40–136)
ALT SERPL-CCNC: 12 U/L (ref 0–55)
APTT PPP: YELLOW S
BACTERIA #/AREA URNS HPF: NEGATIVE /HPF
BASOPHILS # BLD AUTO: 0 10^3/UL (ref 0–0.1)
BASOPHILS NFR BLD AUTO: 0 % (ref 0–10)
BILIRUB SERPL-MCNC: 1.2 MG/DL (ref 0.1–1)
BILIRUB UR QL STRIP: NEGATIVE
BUN/CREAT SERPL: 14
CALCIUM SERPL-MCNC: 8.4 MG/DL (ref 8.5–10.1)
CHLORIDE SERPL-SCNC: 105 MMOL/L (ref 98–107)
CHOLEST SERPL-MCNC: 116 MG/DL (ref ?–200)
CO2 SERPL-SCNC: 22 MMOL/L (ref 21–32)
CREAT SERPL-MCNC: 0.87 MG/DL (ref 0.6–1.3)
EOSINOPHIL # BLD AUTO: 0 10^3/UL (ref 0–0.3)
EOSINOPHIL NFR BLD AUTO: 0 % (ref 0–10)
FIBRINOGEN PPP-MCNC: CLEAR MG/DL
GFR SERPLBLD BASED ON 1.73 SQ M-ARVRAT: > 60 ML/MIN
GLUCOSE SERPL-MCNC: 96 MG/DL (ref 70–105)
GLUCOSE UR STRIP-MCNC: NEGATIVE MG/DL
HCT VFR BLD CALC: 39 % (ref 40–54)
HDLC SERPL-MCNC: 45 MG/DL (ref 40–60)
HGB BLD-MCNC: 12.5 G/DL (ref 13.3–17.7)
KETONES UR QL STRIP: NEGATIVE
LEUKOCYTE ESTERASE UR QL STRIP: NEGATIVE
LYMPHOCYTES # BLD AUTO: 1.6 10^3/UL (ref 1–4)
LYMPHOCYTES NFR BLD AUTO: 9 % (ref 12–44)
MANUAL DIFFERENTIAL PERFORMED BLD QL: NO
MCH RBC QN AUTO: 29 PG (ref 25–34)
MCHC RBC AUTO-ENTMCNC: 32 G/DL (ref 32–36)
MCV RBC AUTO: 90 FL (ref 80–99)
MONOCYTES # BLD AUTO: 0.7 10^3/UL (ref 0–1)
MONOCYTES NFR BLD AUTO: 4 % (ref 0–12)
NEUTROPHILS # BLD AUTO: 13.8 10^3/UL (ref 1.8–7.8)
NEUTROPHILS NFR BLD AUTO: 83 % (ref 42–75)
NITRITE UR QL STRIP: NEGATIVE
PH UR STRIP: 6 [PH] (ref 5–9)
PLATELET # BLD: 161 10^3/UL (ref 130–400)
PMV BLD AUTO: 9.4 FL (ref 9–12.2)
POTASSIUM SERPL-SCNC: 3.7 MMOL/L (ref 3.6–5)
PROT SERPL-MCNC: 5.7 GM/DL (ref 6.4–8.2)
PROT UR QL STRIP: NEGATIVE
RBC #/AREA URNS HPF: (no result) /HPF
SODIUM SERPL-SCNC: 136 MMOL/L (ref 135–145)
SP GR UR STRIP: 1.01 (ref 1.02–1.02)
SQUAMOUS #/AREA URNS HPF: (no result) /HPF
TRIGL SERPL-MCNC: 65 MG/DL (ref ?–150)
VLDLC SERPL CALC-MCNC: 13 MG/DL (ref 5–40)
WBC # BLD AUTO: 16.6 10^3/UL (ref 4.3–11)
WBC #/AREA URNS HPF: (no result) /HPF

## 2020-10-25 RX ADMIN — SODIUM CHLORIDE SCH MLS/HR: 900 INJECTION INTRAVENOUS at 11:37

## 2020-10-25 RX ADMIN — SODIUM CHLORIDE SCH MLS/HR: 900 INJECTION, SOLUTION INTRAVENOUS at 11:38

## 2020-10-25 RX ADMIN — IBUPROFEN PRN MG: 600 TABLET ORAL at 08:49

## 2020-10-25 RX ADMIN — SODIUM CHLORIDE, SODIUM LACTATE, POTASSIUM CHLORIDE, AND CALCIUM CHLORIDE SCH MLS/HR: 600; 310; 30; 20 INJECTION, SOLUTION INTRAVENOUS at 21:00

## 2020-10-25 RX ADMIN — MORPHINE SULFATE PRN MG: 4 INJECTION, SOLUTION INTRAMUSCULAR; INTRAVENOUS at 17:09

## 2020-10-25 RX ADMIN — ACETAMINOPHEN PRN MG: 500 TABLET ORAL at 17:18

## 2020-10-25 RX ADMIN — SODIUM CHLORIDE, SODIUM LACTATE, POTASSIUM CHLORIDE, AND CALCIUM CHLORIDE SCH MLS/HR: 600; 310; 30; 20 INJECTION, SOLUTION INTRAVENOUS at 01:10

## 2020-10-25 RX ADMIN — ACETAMINOPHEN PRN MG: 500 TABLET ORAL at 08:19

## 2020-10-25 RX ADMIN — ASPIRIN SCH MG: 81 TABLET ORAL at 08:19

## 2020-10-25 RX ADMIN — SODIUM CHLORIDE, SODIUM LACTATE, POTASSIUM CHLORIDE, AND CALCIUM CHLORIDE SCH MLS/HR: 600; 310; 30; 20 INJECTION, SOLUTION INTRAVENOUS at 11:37

## 2020-10-25 RX ADMIN — IBUPROFEN PRN MG: 600 TABLET ORAL at 16:26

## 2020-10-25 RX ADMIN — APIXABAN SCH MG: 5 TABLET, FILM COATED ORAL at 20:58

## 2020-10-25 RX ADMIN — IBUPROFEN PRN MG: 600 TABLET ORAL at 01:51

## 2020-10-25 RX ADMIN — METOPROLOL TARTRATE SCH MG: 25 TABLET, FILM COATED ORAL at 21:04

## 2020-10-25 NOTE — NUR
TEMP-39.1. COVID SWAB DONE. 


-------------------------------------------------------------------------------

Addendum: 10/25/20 at 1618 by ASHANTI ANDERSON RN

-------------------------------------------------------------------------------

THIS IS 0816 TEMP

-------------------------------------------------------------------------------

Addendum: 10/25/20 at 1620 by ASHANTI ANDERSON RN

-------------------------------------------------------------------------------

TEMP AT 0816 IS 38.4

## 2020-10-25 NOTE — NUR
TEMP 37.9  C/O OF PAIN WHEN DEEP BREATHING. MORPHINE 2 MG GIVEN IV SLOWLY AND TYLENOL GIVEN 
FOR FEVER.

## 2020-10-25 NOTE — HISTORY & PHYSICAL-HOSPITALIST
IVORYOchoaJAYDENHUGH, 10/25/20 0905:


History of Present Illness


HPI/Chief Complaint


Mr. Cantrell is a 60-year-old male who presented to the [facility] ED via EMS from [place]. He has been having chest pain for 2 days and yesterday it worsened, so he 

called EMS. He states that it is worse when he tries to breathe or talk. 

Morphine from the ER made it better but nothing else has. He does admit to 

nausea but denies vomiting. He localizes his chest pain to the left side of his 

chest around the ridge of his pec. He rates it an 8-9/10 and denies radiation of

the pain. He describes it as "someone standing on my chest". He also has right 

hand and left arm numbness, blurry vision, headache. Denies sore throat. No 

previous occurrence.





Today, he as a little less pain, but more flatulence than usual. He also states 

it helps him to stay warm.


Source:  patient


Exam Limitations:  no limitations


Date Seen


10/25/20


Time Seen by a Provider:  08:30


Attending Physician


Rosario aJrvis MD


PCP


Bakari Moore MD


Referring Physician





Date of Admission


Oct 24, 2020 at 19:54





Home Medications & Allergies


Home Medications


Reviewed patient Home Medication Reconciliation performed by pharmacy medication

reconciliations technician and/or nursing.


Patients Allergies have been reviewed.





Allergies





Allergies


Coded Allergies


  No Known Drug Allergies (Tbfnerwmze21/13/19)








Past Medical-Social-Family Hx


Past Med/Social Hx:  Reviewed Nursing Past Med/Soc Hx


Patient Social History


Marrital Status:  


Alcohol Use:  Occasionally Uses


Alcohol Beverage of Choice:  Beer, Whiskey


Recreational Drug Use:  Yes


Drug of Choice:  Pt denies current use but 10/24/2020 UDS pos for amphetamines 

and opiates


Smoking Status:  Current Everyday Smoker


Type Used:  Cigarettes


2nd Hand Smoke Exposure:  No


Recent Foreign Travel:  No


Contact w/other who traveled:  No


Recent Hopitalizations:  No


Recent Infectious Disease Expo:  No





Seasonal Allergies


Seasonal Allergies:  No





Past Medical History


Surgeries:  Orthopedic (bilateral hip replacements)


History of Blood Disorders:  No





Family History


Reviewed Nursing Family Hx


Cancer (father had NSCLC), CAD Over 55 Years Old (mother  from MI), Diabetes

(most family members)





Review of Systems


Constitutional:  chills, fever


EENTM:  No nose congestion, No throat pain


Respiratory:  No cough; short of breath (from pain)


Cardiovascular:  chest pain, palpitations


Gastrointestinal:  nausea; No vomiting


Genitourinary:  No dysuria, No frequency


Musculoskeletal:  No muscle pain, No muscle weakness


Skin:  No dryness, No rash


Psychiatric/Neurological:  Headache, Numbness





Physical Exam


Physical Exam


Vital Signs





Vital Signs - First Documented




















Capillary Refill : Less Than 3 Seconds


Height, Weight, BMI


Height: '"


Weight: lbs. oz. kg; 23.13 BMI


Method:


General Appearance:  No Apparent Distress, WD/WN


HEENT:  PERRL/EOMI, Pharynx Normal, Other (missing a few teeth)


Neck:  Non Tender, Supple


Respiratory:  Lungs Clear, Normal Breath Sounds


Cardiovascular:  No Murmur, Irregularly Irregular


Gastrointestinal:  Normal Bowel Sounds, Non Tender, Soft


Extremity:  Normal Capillary Refill, No Pedal Edema, Other (fresh wounds on 

bilateral lower extremities)


Neurologic/Psychiatric:  Alert, Oriented x3, Normal Mood/Affect


Skin:  Normal Color, Warm/Dry





Results


Results/Procedures


Labs


Laboratory Tests


10/24/20 18:38








10/25/20 05:02








Patient resulted labs reviewed.


Imaging:  Reviewed Imaging Report


Imaging


NAME:   TERRANCE CANTRELL


MED REC#:   [id number]


ACCOUNT#:   [id number]


PT STATUS:   ADM IN


:   1960


PHYSICIAN:   JACKELYN ZEPEDA MD


ADMIT DATE:   10/24/20/[facility]


                                  ***Signed***


Date of Exam:10/24/20





CHEST 1 VIEW, AP/PA ONLY








EXAM: Chest 1 view, AP/PA only.





INDICATION: Chest pain.





COMPARISON: 2019 chest radiograph.





FINDINGS: Normal heart size and central pulmonary vascularity. No


focal pulmonary opacity. No pleural effusion or pneumothorax. No


acute osseous finding. No significant change.





IMPRESSION: No acute cardiopulmonary finding. 





Dictated by: 





  Dictated on workstation # [id number]








Dict:   10/24/20 1921


Trans:   10/24/20 2218


[facility] [id number]





Interpreted by:     OZZY HANNA MD


Electronically signed by: OZZY HANNA MD 10/24/20 2218





Assessment/Plan


Assessment and Plan


1. Atrial fibrillation with RVR


* Patient presented to the ED for chest pain


* EKG revealed atrial fibrillation with RVR


* Cardiology consulted while in ED, appreciate their assistance


* Started on cardizem drip and Lovenox


* Tele


* CHADsVASc 0


* HASBLED 1


* TSH ordered





2. Fever of unknown origin


* Patient has spiked a fever during stay


* CXR revealed no acute cardiopulmonary changes


* Patient denies cough, congestion, sore throat and states he has no known 

  COVID-19 contacts


* COVID negative, will swab for flu A & B


* Monitor closely


* Tylenol and Motrin available as antipyretics





3. Polysubstance abuse


* Patient denies current use of methamphetamines


* UDS in ED was positive for amphetamines and opiates


* Given morphine in ED and appears this was given prior to UDS


* Will encourage complete cessation


* Will order HIV and hepatitis panels given positive UDS - no protein gap on 

  labs





4. Tobacco abuse


* Patient states he began smoking cigarettes 1 year ago


* Will encourage complete cessation





Diet: heart healthy


PPx: lovenox


FULL CODE





Clinical Quality Measures


AMI/AHF:


ASA po Prior to arrival:  Yes





DVT/VTE Risk/Contraindication:


Risk Factor Score Per Nursin


RFS Level Per Nursing on Admit:  2=Moderate





HARSHAL CASIANO MD 10/25/20 0953:


Assessment/Plan


Admission Diagnosis


A. fib with RVR


Chest pain


Febrile illness of uncertain etiology-chest x-ray shows a left lower lobe 

infiltrate-will start Zithromax max and Rocephin to cover both Streptococcus 

pneumonia and mycoplasma pneumonia as COVID and flu a and B are both negative


Tobaccoism non-curtailed


History of drug use with positive urine drug screen will screen for HIV and 

hepatitis C


Admission Status:  Observation





Supervisory-Addendum Brief


Verification & Attestation


Participated in pt care:  history, physical


Personally performed:  exam, history, supervision of care


Care discussed with:  Medical Student


Procedures:  n/a


Results interpretation:  Verified all documentation


Verification and Attestation of Medical Student E/M Service





A medical student performed and documented this service in my presence. I 

reviewed and verified all information documented by the medical student and made

modifications to such information, when appropriate. I personally performed the 

physical exam and medical decision making. 





 Harshal Casiano, Oct 25, 2020,11:14


  fourth-year student saw patient first and obtained the history and physical 

and presented the patient to me.  The patient was then seen by myself in the 

presence of the student and I performed a complete history and physical exam 

with the student in my presence.  Patient history and plan of care discussed 

with the student.











HUGH ESTEVEZ,         Oct 25, 2020 09:05


HARSHAL CASIANO MD         Oct 25, 2020 09:53

## 2020-10-25 NOTE — NUR
THIS RN NOTIFIED DR. RIOJAS OF PATIENT'S INCREASING TEMPERATURE .2 DEGREES FAHRENHEIT 
DESPITE 500MG PO TYLENOL GIVEN AT 2335. NEW ORDER RECEIVED FOR MOTRIN 600MG PO Q6HR. SEE 
ORDER HX.

## 2020-10-25 NOTE — CARDIOLOGY PROGRESS NOTE
Subjective


Date Seen by Provider:  Oct 25, 2020


Time Seen by Provider:  12:29


Subjective/Events-last exam


Patient is laying down in bed, no new complaint, having some chest pain with 

deep inspiration


Review of Systems


General:  No Chills, No Night Sweats, No Fatigue, No Malaise, No Appetite, No 

Other


HEENT:  No Head Aches, No Visual Changes, No Eye Pain, No Ear Pain, No 

Dysphasia, No Sinus Congestion, No Post Nasal Drip, No Sore Throat, No Other


Pulmonary:  Dyspnea; No Cough, No Pleuritic Chest Pain, No Other


Cardiovascular:  Chest Pain; No: Palpitations, Orthopnea, Paroxysmal Noc. 

Dyspnea, Edema, Lt Headedness, Other





Objective-Cardiology


Exam


Last Set of Vital Signs





Vital Signs








 10/24/20 10/25/20 10/25/20





 20:56 09:31 11:00


 


Temp  38.8 


 


Pulse   103


 


Resp   19


 


B/P (MAP)   98/79 (85)


 


Pulse Ox   96


 


O2 Delivery   Room Air


 


O2 Flow Rate 2.00  





Capillary Refill : Less Than 3 Seconds


I&O











Intake and Output 


 


 10/25/20





 00:00


 


Intake Total 1600 ml


 


Balance 1600 ml


 


 


 


IV Total 1600 ml


 


Daily Weight Change No





 No








General:  Alert, Cooperative


HEENT:  PERRLA


Neck:  No Thyromegaly


Lungs:  Normal Air Movement


Heart:  Other (atrial flutter)


Abdomen:  No Tenderness, No Hepatosplenomegaly, No Masses


Extremities:  No Edema, Normal Pulses, No Tenderness/Swelling


Skin:  No Significant Lesion


Neuro:  Normal Speech, Normal Tone, Sensation Intact


Psych/Mental Status:  Mental Status NL, Mood NL





Results


Lab


Laboratory Tests


10/24/20 18:38








10/25/20 05:02














A/P-Cardiology


Admission Diagnosis


Atrial flutter


Tachycardia


Chest pain


Hypertension





Assessment/Plan


Atrial flutter with variable response, improved with Cardizem drip but had few 

episode of hypotension.  I will start oral diltiazem and oral Lopressor and 

evaluate tolerance and response 





Patient was started on oral anticoagulation to reduce the risk of stroke





Fever, septic workup initiated, COVID testing negative, influenza A and B 

pending.  Managed by primary care team





Palpitation, secondary to tachycardia.  Continue to monitor





Chest pain, nonspecific etiology, atypical in presentation, cardiac enzymes neg

ative.  Continue to monitor





Hypertension, labile blood pressure, episodes of hypotension.  Continue to 

monitor blood pressure after initiating oral medication





Methamphetamine abuse, tobacco use.  Educated on avoiding illicit drug use





Clinical Quality Measures


AMI/AHF:


ASA po Prior to arrival:  Yes





DVT/VTE Risk/Contraindication:


Risk Factor Score Per Nursin


RFS Level Per Nursing on Admit:  2=Moderate











CRISTAL ESPARZA MD              Oct 25, 2020 12:32

## 2020-10-25 NOTE — DIAGNOSTIC IMAGING REPORT
HISTORY: Fever, chest pain.



COMPARISON: 10/24/2020.



TECHNIQUE: Frontal view of the chest.



FINDINGS:



There are new airspace opacities in the left midlung and left

lung base. No pleural effusion or pneumothorax is seen. The

cardiac silhouette is normal in size.



IMPRESSION:

1. New airspace opacities in the left midlung and left lung base,

consistent with pneumonia given the patient's history.



Dictated by: 



  Dictated on workstation # [id number]

## 2020-10-25 NOTE — NUR
TEMP 39.1 MOTRIN AND TYLENOL GIVEN FOR FEVER. CONT TO C/O OF LEFT CHEST PAIN ESPECIALLY WHEN 
DEEP BREATHING.

## 2020-10-26 VITALS — DIASTOLIC BLOOD PRESSURE: 77 MMHG | SYSTOLIC BLOOD PRESSURE: 107 MMHG

## 2020-10-26 VITALS — DIASTOLIC BLOOD PRESSURE: 69 MMHG | SYSTOLIC BLOOD PRESSURE: 110 MMHG

## 2020-10-26 VITALS — DIASTOLIC BLOOD PRESSURE: 74 MMHG | SYSTOLIC BLOOD PRESSURE: 107 MMHG

## 2020-10-26 VITALS — SYSTOLIC BLOOD PRESSURE: 93 MMHG | DIASTOLIC BLOOD PRESSURE: 64 MMHG

## 2020-10-26 VITALS — DIASTOLIC BLOOD PRESSURE: 63 MMHG | SYSTOLIC BLOOD PRESSURE: 102 MMHG

## 2020-10-26 VITALS — SYSTOLIC BLOOD PRESSURE: 97 MMHG | DIASTOLIC BLOOD PRESSURE: 67 MMHG

## 2020-10-26 VITALS — SYSTOLIC BLOOD PRESSURE: 111 MMHG | DIASTOLIC BLOOD PRESSURE: 80 MMHG

## 2020-10-26 VITALS — SYSTOLIC BLOOD PRESSURE: 91 MMHG | DIASTOLIC BLOOD PRESSURE: 54 MMHG

## 2020-10-26 VITALS — DIASTOLIC BLOOD PRESSURE: 63 MMHG | SYSTOLIC BLOOD PRESSURE: 97 MMHG

## 2020-10-26 VITALS — DIASTOLIC BLOOD PRESSURE: 65 MMHG | SYSTOLIC BLOOD PRESSURE: 108 MMHG

## 2020-10-26 VITALS — DIASTOLIC BLOOD PRESSURE: 73 MMHG | SYSTOLIC BLOOD PRESSURE: 97 MMHG

## 2020-10-26 VITALS — SYSTOLIC BLOOD PRESSURE: 102 MMHG | DIASTOLIC BLOOD PRESSURE: 68 MMHG

## 2020-10-26 RX ADMIN — APIXABAN SCH MG: 5 TABLET, FILM COATED ORAL at 20:44

## 2020-10-26 RX ADMIN — SODIUM CHLORIDE, SODIUM LACTATE, POTASSIUM CHLORIDE, AND CALCIUM CHLORIDE SCH MLS/HR: 600; 310; 30; 20 INJECTION, SOLUTION INTRAVENOUS at 23:08

## 2020-10-26 RX ADMIN — APIXABAN SCH MG: 5 TABLET, FILM COATED ORAL at 08:49

## 2020-10-26 RX ADMIN — SODIUM CHLORIDE, SODIUM LACTATE, POTASSIUM CHLORIDE, AND CALCIUM CHLORIDE SCH MLS/HR: 600; 310; 30; 20 INJECTION, SOLUTION INTRAVENOUS at 05:55

## 2020-10-26 RX ADMIN — SODIUM CHLORIDE SCH MLS/HR: 900 INJECTION, SOLUTION INTRAVENOUS at 11:53

## 2020-10-26 RX ADMIN — ASPIRIN SCH MG: 81 TABLET ORAL at 08:49

## 2020-10-26 RX ADMIN — SODIUM CHLORIDE SCH MLS/HR: 900 INJECTION INTRAVENOUS at 11:53

## 2020-10-26 RX ADMIN — METOPROLOL TARTRATE SCH MG: 25 TABLET, FILM COATED ORAL at 20:44

## 2020-10-26 RX ADMIN — MORPHINE SULFATE PRN MG: 4 INJECTION, SOLUTION INTRAMUSCULAR; INTRAVENOUS at 23:25

## 2020-10-26 RX ADMIN — METOPROLOL TARTRATE SCH MG: 25 TABLET, FILM COATED ORAL at 08:48

## 2020-10-26 RX ADMIN — SODIUM CHLORIDE, SODIUM LACTATE, POTASSIUM CHLORIDE, AND CALCIUM CHLORIDE SCH MLS/HR: 600; 310; 30; 20 INJECTION, SOLUTION INTRAVENOUS at 08:48

## 2020-10-26 NOTE — DISCHARGE SUMMARY
Diagnosis/Chief Complaint


Date of Admission


Oct 24, 2020 at 19:54


Date of Discharge


10/26/2020


Admission Diagnosis


Admission Diagnosis


See problem list





Discharge Diagnosis


See Below


Problems/Diagnosis:  


(1) Atrial flutter with rapid ventricular response


Assessment & Plan:  10/26: Cardiology consulted, patient started on OAC, Plan 

for KAITY later this afternoon, 1620: updated on patient that he has what looks to

be a small thrombus so no cardioversion, Dr Cervantes will see patient in 2 weeks, 

Digoxin added


10/27: ok to d/c home per cardiology, medications sent to repository


Status:  Acute


(2) LLL pneumonia


Assessment & Plan:  10/26: Will continue azithromycin and Rocephin


Qualifiers:  


   Qualified Codes:  J18.9 - Pneumonia, unspecified organism


Status:  Acute


(3) Tobacco abuse


Assessment & Plan:  - Discussed the need for cessation


Status:  Chronic


(4) Substance abuse


Assessment & Plan:  - HIV/HepC pending, discussed he need for cessation with his

cardiac problems


Status:  Chronic





Discharge Summary-Simple/Stand


Procedures


10/26/20: KAITY by Dr Cervantes


Consultations


Dr Cervantes: Cardiology


Discharge Physical Examination


Allergies:  


Coded Allergies:  


     No Known Drug Allergies (Unverified , 19)


Vitals & I&Os





Vital Sign - Last 12Hours








  Date Time  Temp Pulse Resp B/P (MAP) Pulse Ox O2 Delivery O2 Flow Rate FiO2


 


10/26/20 15:36  87  97/67 (77) 96 Room Air  


 


10/26/20 11:55 37.2  16     


 


10/24/20 20:56       2.00 














Intake and Output 


 


 10/25/20





 23:59


 


Intake Total 1800 ml


 


Output Total 875 ml


 


Balance 925 ml








General Appearance:  Alert, Oriented X3, Cooperative, No Acute Distress


Respiratory:  Clear to Auscultation, Normal Air Movement


Cardiovascular:  Regular Rate, No Murmurs


Abdominal:  Normal Bowel Sounds, Soft, No Tenderness, No Masses


Extremities:  No Edema, No Tenderness/Swelling


Skin:  No Rashes


Neuro:  Normal Speech, Strength at 5/5 X4 Ext, Cranial Nerves 3-12 NL


Psych/Mental Status:  Mental Status NL, Mood NL





Hospital Course


Was the Problem List Reviewed?:  Yes


See final discharge diagnosis.





Discussion & Recommendations


59 yo M that was admitted for chest pain and found to have atrial flutter with 

RVR. Seen by cardiology and started on rate control medications. Dr Cervantes 

attempted KAITY with cardioversion however small thrombus may be present so he 

will see patient in 2 weeks.





Discharge


Condition at discharge


stable


Instructions to patient/family


Please see electronic discharge instructions given to patient.


Discharge Medications


Reviewed and agree with Discharge Medication list on patient's Discharge 

Instruction sheet





Clinical Quality Measures


AMI/AHF:


ASA po Prior to arrival:  Yes





DVT/VTE Risk/Contraindication:


Risk Factor Score Per Nursin


RFS Level Per Nursing on Admit:  2=Moderate











SUSAN MANCILLA MD               Oct 26, 2020 16:25

## 2020-10-26 NOTE — NUR
SPOKE WITH THE PT TO COMPLETE THE MED REC



PT DENIES TAKING ANY PRESCRIPTION OR OTC MEDICATIONS, THEREFORE I SET THE MED REC AS "NO 
MEDICATIONS".

## 2020-10-26 NOTE — NUR
1630 PT BACK FROM CATH LAB, PT DROWSY AWAKENS TO VERBAL STIMULI,  CALL LIGHT AND PERSONAL 
ITEMS WITHIN REACH WILL CONTINUE TO MONITOR.

## 2020-10-26 NOTE — NUR
PT BROUGHT DOWN TO CATH LAB HOLDING BAY #1 FOR CARDIOVERSION/ KAITY WITH DR ESPARZA AND JOHN NORWOOD.

## 2020-10-26 NOTE — CARDIOLOGY PROGRESS NOTE
Subjective


Date Seen by Provider:  Oct 26, 2020


Time Seen by Provider:  09:41


Subjective/Events-last exam


Patient is laying down in bed, feeling better.  No new complaint


Review of Systems


General:  No Chills, No Night Sweats, No Fatigue, No Malaise, No Appetite, No 

Other


HEENT:  No Head Aches, No Visual Changes, No Eye Pain, No Ear Pain, No 

Dysphasia, No Sinus Congestion, No Post Nasal Drip, No Sore Throat, No Other


Pulmonary:  No Dyspnea, No Cough, No Pleuritic Chest Pain, No Other


Cardiovascular:  No: Chest Pain, Palpitations, Orthopnea, Paroxysmal Noc. 

Dyspnea, Edema, Lt Headedness, Other





Objective-Cardiology


Exam


Last Set of Vital Signs





Vital Signs








 10/26/20 10/26/20 10/26/20





 11:55 16:08 16:13


 


Temp 37.2  


 


Pulse   84


 


Resp 16  


 


B/P (MAP)   97/73 (81)


 


Pulse Ox   97


 


O2 Delivery   Room Air


 


O2 Flow Rate  10.00 





Capillary Refill : Less Than 3 Seconds


I&O











Intake and Output 


 


 10/26/20





 00:00


 


Intake Total 4060 ml


 


Output Total 1475 ml


 


Balance 2585 ml


 


 


 


Intake Oral 1050 ml


 


IV Total 3010 ml


 


Output Urine Total 1475 ml








General:  Alert, Cooperative


HEENT:  PERRLA


Neck:  No Thyromegaly


Lungs:  Clear to Auscultation, Normal Air Movement


Heart:  Normal S1, Normal S2, Other (atrial flutter)


Abdomen:  Normal Bowel Sounds, No Tenderness, No Hepatosplenomegaly, No Masses


Extremities:  No Clubbing, No Cyanosis, No Edema, Normal Pulses, No 

Tenderness/Swelling


Skin:  No Rashes, No Significant Lesion


Neuro:  Normal Gait, Normal Speech, Strength at 5/5 X4 Ext, Normal Tone, 

Sensation Intact


Psych/Mental Status:  Mental Status NL, Mood NL





Results


Lab








A/P-Cardiology


Admission Diagnosis


Atrial flutter


Tachycardia


Chest pain


Hypertension





Assessment/Plan


Atrial flutter with variable response, heart rate is better but increased 

significantly with minimal movement.  Had breakfast today, I am planning to 

proceed with KAITY and electrical cardioversion later this afternoon





Patient was started on oral anticoagulation to reduce the risk of stroke





Fever, septic workup initiated, COVID testing negative, and at this time 

management by primary care team





Palpitation, secondary to tachycardia.  Continue to monitor





Chest pain, nonspecific etiology, atypical in presentation, cardiac enzymes 

negative.  Continue to monitor





Hypertension, labile blood pressure, episodes of hypotension.  Continue to 

monitor blood pressure after initiating oral medication





Methamphetamine abuse, tobacco use.  Educated on avoiding illicit drug use








Addendum on 2020 at 430 p.m., KAITY was done showing normal left 

ventricle and left ventricular size, normal left atrium, there is an echogenic 

density noted at the left atrial appendage suspicious of thrombus.  Normal right

atrium and right ventricle, no septal defect noted, mitral valve, tricuspid 

valve, aortic valve and pulmonic valve were functioning normally, no vegetation 

was noted.


I would continue with rate controlling medication, cannot tolerate higher dose 

of beta blockers or calcium blockers.  I will digoxin.  Monitor tolerance and 

response





Clinical Quality Measures


AMI/AHF:


ASA po Prior to arrival:  Yes





DVT/VTE Risk/Contraindication:


Risk Factor Score Per Nursin


RFS Level Per Nursing on Admit:  2=Moderate











CRISTAL ESPARZA MD             Oct 26, 2020 9:42 am

## 2020-10-27 VITALS — DIASTOLIC BLOOD PRESSURE: 67 MMHG | SYSTOLIC BLOOD PRESSURE: 115 MMHG

## 2020-10-27 VITALS — SYSTOLIC BLOOD PRESSURE: 110 MMHG | DIASTOLIC BLOOD PRESSURE: 62 MMHG

## 2020-10-27 VITALS — DIASTOLIC BLOOD PRESSURE: 65 MMHG | SYSTOLIC BLOOD PRESSURE: 111 MMHG

## 2020-10-27 VITALS — DIASTOLIC BLOOD PRESSURE: 78 MMHG | SYSTOLIC BLOOD PRESSURE: 120 MMHG

## 2020-10-27 VITALS — SYSTOLIC BLOOD PRESSURE: 115 MMHG | DIASTOLIC BLOOD PRESSURE: 67 MMHG

## 2020-10-27 LAB
BASOPHILS # BLD AUTO: 0 10^3/UL (ref 0–0.1)
BASOPHILS NFR BLD AUTO: 0 % (ref 0–10)
BUN/CREAT SERPL: 16
CALCIUM SERPL-MCNC: 8.3 MG/DL (ref 8.5–10.1)
CHLORIDE SERPL-SCNC: 106 MMOL/L (ref 98–107)
CO2 SERPL-SCNC: 23 MMOL/L (ref 21–32)
CREAT SERPL-MCNC: 0.93 MG/DL (ref 0.6–1.3)
EOSINOPHIL # BLD AUTO: 0.2 10^3/UL (ref 0–0.3)
EOSINOPHIL NFR BLD AUTO: 2 % (ref 0–10)
GFR SERPLBLD BASED ON 1.73 SQ M-ARVRAT: > 60 ML/MIN
GLUCOSE SERPL-MCNC: 100 MG/DL (ref 70–105)
HCT VFR BLD CALC: 34 % (ref 40–54)
HGB BLD-MCNC: 11.1 G/DL (ref 13.3–17.7)
LYMPHOCYTES # BLD AUTO: 1.7 10^3/UL (ref 1–4)
LYMPHOCYTES NFR BLD AUTO: 16 % (ref 12–44)
MANUAL DIFFERENTIAL PERFORMED BLD QL: NO
MCH RBC QN AUTO: 29 PG (ref 25–34)
MCHC RBC AUTO-ENTMCNC: 33 G/DL (ref 32–36)
MCV RBC AUTO: 89 FL (ref 80–99)
MONOCYTES # BLD AUTO: 0.6 10^3/UL (ref 0–1)
MONOCYTES NFR BLD AUTO: 5 % (ref 0–12)
NEUTROPHILS # BLD AUTO: 7.9 10^3/UL (ref 1.8–7.8)
NEUTROPHILS NFR BLD AUTO: 76 % (ref 42–75)
PLATELET # BLD: 220 10^3/UL (ref 130–400)
PMV BLD AUTO: 9.1 FL (ref 9–12.2)
POTASSIUM SERPL-SCNC: 4 MMOL/L (ref 3.6–5)
SODIUM SERPL-SCNC: 138 MMOL/L (ref 135–145)
WBC # BLD AUTO: 10.5 10^3/UL (ref 4.3–11)

## 2020-10-27 RX ADMIN — SODIUM CHLORIDE SCH MLS/HR: 900 INJECTION INTRAVENOUS at 11:13

## 2020-10-27 RX ADMIN — ASPIRIN SCH MG: 81 TABLET ORAL at 08:49

## 2020-10-27 RX ADMIN — METOPROLOL TARTRATE SCH MG: 25 TABLET, FILM COATED ORAL at 08:49

## 2020-10-27 RX ADMIN — SODIUM CHLORIDE, SODIUM LACTATE, POTASSIUM CHLORIDE, AND CALCIUM CHLORIDE SCH MLS/HR: 600; 310; 30; 20 INJECTION, SOLUTION INTRAVENOUS at 11:03

## 2020-10-27 RX ADMIN — SODIUM CHLORIDE SCH MLS/HR: 900 INJECTION, SOLUTION INTRAVENOUS at 11:12

## 2020-10-27 RX ADMIN — ACETAMINOPHEN PRN MG: 500 TABLET ORAL at 12:20

## 2020-10-27 RX ADMIN — APIXABAN SCH MG: 5 TABLET, FILM COATED ORAL at 08:49

## 2020-10-27 NOTE — NUR
Spoke with primary care nurse regarding discharge and any anticipated needs. She reported 
that she has a taxi voucher for the patient and will let the taxi know that he needs to go 
to the [facility] before they take him home so he can  his needed 
medications. 



We visited about the patient needing ask for Dr. Rossi's nurse when he arrives at [facility] and 
that he will have digoxin that he will need to  on the family practice side and then 
move to the registration side to  the rest of his needed medications at the 
repository. 



No further needs voiced or noted at this time.

## 2020-10-27 NOTE — NUR
THIS NURSE EDUCATED PT ON DISCHARGE INSTRUCTION. PT STATED UNDERSTANDING. PT HAS A TAXI 
COMING TO PICK HIM UP AND TAKE HIM TO [facility] TO GET HIS MEDICATIONS AND THEN HOME. PT STATED 
UNDERSTANDING ON WHERE TO GET HIS MEDICATIONS AND TO SEE DR MANCILLA'S NURSE BEFORE HE LEAVES  
TO MAKE SURE HE GOT ALL HIS MEDICATIONS.

## 2020-10-27 NOTE — CARDIOLOGY PROGRESS NOTE
Subjective


Date Seen by Provider:  Oct 27, 2020


Time Seen by Provider:  08:56


Subjective/Events-last exam


Patient is laying down in bed, feeling better.  No new complaint


Review of Systems


General:  No Chills, No Night Sweats, No Fatigue, No Malaise, No Appetite, No 

Other


HEENT:  No Head Aches, No Visual Changes, No Eye Pain, No Ear Pain, No 

Dysphasia, No Sinus Congestion, No Post Nasal Drip, No Sore Throat, No Other


Pulmonary:  No Dyspnea, No Cough, No Pleuritic Chest Pain, No Other


Cardiovascular:  No: Chest Pain, Palpitations, Orthopnea, Paroxysmal Noc. 

Dyspnea, Edema, Lt Headedness, Other





Objective-Cardiology


Exam


Last Set of Vital Signs





Vital Signs








 10/26/20 10/27/20





 16:08 08:35


 


Temp  37.1


 


Pulse  86


 


Resp  17


 


B/P (MAP)  115/67 (83)


 


Pulse Ox  97


 


O2 Delivery  Room Air


 


O2 Flow Rate 10.00 





Capillary Refill : Less Than 3 Seconds


I&O











Intake and Output 


 


 10/27/20





 00:00


 


Intake Total 1215 ml


 


Output Total 2600 ml


 


Balance -1385 ml


 


 


 


Intake Oral 1215 ml


 


Output Urine Total 2600 ml








General:  Alert, Oriented X3, Cooperative, No Acute Distress


HEENT:  Atraumatic, PERRLA, Mucous Memb Moist/Pink


Neck:  Supple, No JVD, No Thyromegaly


Lungs:  Clear to Auscultation, Normal Air Movement


Heart:  Normal S1, Normal S2, No Murmurs, Other (atrial flutter)


Abdomen:  Normal Bowel Sounds, Soft, No Tenderness, No Masses


Extremities:  No Clubbing, No Cyanosis, No Edema, No Tenderness/Swelling


Skin:  No Rashes, No Breakdown


Neuro:  Normal Gait, Normal Speech, Strength at 5/5 X4 Ext, Cranial Nerves 3-12 

NL


Psych/Mental Status:  Mental Status NL, Mood NL





Results


Lab


Laboratory Tests


10/27/20 03:04














A/P-Cardiology


Admission Diagnosis


Atrial flutter


Tachycardia


Chest pain


Hypertension





Assessment/Plan


Atrial flutter with variable response, KAITY was done on 2020 showing 

questionable left atrial appendage thrombus.  We'll continue with rate 

controlling medication, continue on diltiazem, metoprolol and I added digoxin 

yesterday.  Heart rate is better controlled.  Okay for discharge





Patient was started on oral anticoagulation to reduce the risk of stroke





Fever, septic workup initiated, COVID testing negative, and at this time 

management by primary care team





Palpitation, secondary to tachycardia.  Continue to monitor





Chest pain, nonspecific etiology, atypical in presentation, cardiac enzymes 

negative.  Continue to monitor





Hypertension, labile blood pressure, episodes of hypotension.  Continue to 

monitor blood pressure after initiating oral medication





Methamphetamine abuse, tobacco use.  Educated on avoiding illicit drug use





Clinical Quality Measures


AMI/AHF:


ASA po Prior to arrival:  Yes





DVT/VTE Risk/Contraindication:


Risk Factor Score Per Nursin


RFS Level Per Nursing on Admit:  2=Moderate











CRISTAL ESPARZA MD             Oct 27, 2020 8:58 am

## 2020-10-27 NOTE — DISCHARGE SUMMARY
Discharge [facility], [facility]


Reconcile Patient Problems


Problems Reviewed?:  Yes





Discharge Medications


New, Converted or Re-Newed RX:  Transmitted to Pharmacy (And others to our 

repository at [facility])


New Medications:  


Apixaban (Eliquis) 5 Mg Tablet


5 MG PO BID, #60 TAB





Aspirin (Aspirin EC) 81 Mg Tablet.dr


81 MG PO DAILY, #30 TAB





Digoxin (Digox) 250 Mcg Tablet


0.25 MG PO DAILY, #30 TAB





Diltiazem HCl (Diltiazem HCl) 30 Mg Tablet


30 MG PO Q6HR, #120 TAB





Metoprolol Tartrate (Metoprolol Tartrate) 25 Mg Tablet


12.5 MG PO BID, #60 TAB





Nitroglycerin (Nitroglycerin) 0.4 Mg Tab.subl


0.4 MG SL UD PRN for CHEST PAIN (ANGINA), #10 TAB











Patient Instructions


Goal/Follow Up Appt:  


F.u with Dr Cervantes in 2 weeks





F.u with PCP Bakari Moore next week





Activity & Diet


Discharge Diet:  Cardiac Diet


Activity as Tolerated:  Yes











SUSAN MANCILLA MD               Oct 27, 2020 11:15

## 2024-01-11 NOTE — ANESTHESIA-GENERAL POST-OP
MAC


Significant Intra-Op Events


Notes


Late entry from 10/26





Patient Condition


Mental Status/LOC:  Same as Preop


Cardiovascular:  Satisfactory


Nausea/Vomiting:  Absent


Respiratory:  Satisfactory


Pain:  Controlled


Complications:  Absent





Post Op Complications


Complications


None





Follow Up Care/Instructions


Patient Instructions


None needed.





Anesthesiology Discharge Order


Discharge Order


Patient is doing well, no complaints, stable vital signs, no apparent adverse 

anesthesia problems.   


No complications reported per nursing.











JONH ALMANZAR CRNA          Oct 28, 2020 07:54
negative